# Patient Record
(demographics unavailable — no encounter records)

---

## 2024-12-27 NOTE — CONSULT LETTER
[Dear  ___] : Dear  [unfilled], [Courtesy Letter:] : I had the pleasure of seeing your patient, [unfilled], in my office today. [Please see my note below.] : Please see my note below. [Consult Closing:] : Thank you very much for allowing me to participate in the care of this patient.  If you have any questions, please do not hesitate to contact me. [Sincerely,] : Sincerely, [FreeTextEntry3] : Ave Diaz MD Pediatric Endocrinology [facility] [phone]

## 2024-12-27 NOTE — PHYSICAL EXAM
[Healthy Appearing] : healthy appearing [Well Nourished] : well nourished [Interactive] : interactive [Normal Appearance] : normal appearance [Well formed] : well formed [Normally Set] : normally set [Normal S1 and S2] : normal S1 and S2 [Clear to Ausculation Bilaterally] : clear to auscultation bilaterally [Abdomen Soft] : soft [Abdomen Tenderness] : non-tender [] : no hepatosplenomegaly [Normal] : normal  [Dysmorphic] : non-dysmorphic [Acanthosis Nigricans___] : no acanthosis nigricans [Microcephaly] : no microcephaly [WNL for age] : within normal limits of age [Goiter] : no goiter [Enlarged Diffusely] : was not enlarged [Murmur] : no murmurs

## 2024-12-27 NOTE — DATA REVIEWED
[FreeTextEntry1] : HbA1c decreased to 5.8%.  Additional labs:  TSH and FT4 normal  TG, TPO Ab- Negative  TSI- Negative   Fasting Glucose 93- Normal  C-peptide 2.2- Normal  Insulin level - 13.7- Normal  GAD65 Ab- Negative   HbA1c in office today 5.6%

## 2024-12-27 NOTE — REVIEW OF SYSTEMS
[Nl] : Neurological [Hair Symptoms] : no hair symptoms [Nail Symptoms] : no nail symptoms [Polydypsia] : no polydipsia [Polyuria] : no polyuria

## 2024-12-27 NOTE — ASSESSMENT
[FreeTextEntry1] : Mildred Mcginnis is a 14y6mF with previous history of obesity who is presenting for follow up endocrinology evaluation for elevated Hba1c.  Plan:  - HbA1c in office today normal- 5.6%, continues without signs/symptoms of diabetes.  - Advised today to follow up in 6-12 months give normal level. Will repeat POC HbA1c in office at visit.  - Counseled on signs/symptoms of diabetes and to call office if these occur prior to next visit.  - Continue with overall healthy eating, but no need for restricting carbohydrates.  - If continued HbA1c in pre-diabetes range, despite weight loss and healthy eating with negative T1D Ab, may need MEHUL testing.   Ave Diaz MD Pediatric Endocrinology [facility] [phone]

## 2024-12-27 NOTE — HISTORY OF PRESENT ILLNESS
[Regular Periods] : regular periods [FreeTextEntry2] : Mildred Mcginnis is a 14y6mF with previous history of obesity who is presenting for follow up endocrinology evaluation for elevated Hba1c.  Seen for initial visit on 2/7/24. She was last seen 5/15/24.   Reason for visit- Elevated HbA1c In 2019 she was told her HbA1c was elevated (5.9-6.0) after done on routine labs. At that time, she was overweight with weight 92-95%, BMI 85-89%. She made dietary changes and reported her HbA1c normalized.  She was seen by PCP in October 2023 for routine check up. At that time, labs done and she was noted to have had an increase in HbA1c to 6.0%. She reports that over the summer she consumed a significant amount of sugary foods (Ice cream every day, coca cola 2 per day, milkshakes everyday, and starbucks sugary drinks everyday). After the HbA1c increased in October, she made changes and has been eating healthier with less sugar. HbA1c rechecked in January 2024 and continued to have HbA1c of 6.0%.  After initial visit with me, labs ordered at last visit including T1D antibodies.  HbA1c decreased to 5.8%.  Additional labs:  TSH and FT4 normal  TG, TPO Ab- Negative  TSI- Negative   Fasting Glucose 93- Normal  C-peptide 2.2- Normal  Insulin level - 13.7- Normal  GAD65 Ab- Negative  ZnT8Ab- Pending Insulin Ab- Was not run  Islet Cell Ab- Does not appear to have been run.   Interval Events:  - Since last visit, reports she has continued with healthy eating, but has been having more [employer] drinks and chocolate compared to previous visit.  - She continues to deny: polyuria, polydipsia, excessive fatigue/tiredness.  - She has not lost weight since last visit, she has actually gained weight.  - HbA1c in office today 5.6%.  [FreeTextEntry1] : LMP 12/27/24.

## 2024-12-27 NOTE — DISCUSSION/SUMMARY
[FreeTextEntry1] : Mildred Mcginnis is a 14y6mF with previous history of obesity who is presenting for follow up endocrinology evaluation for elevated Hba1c.  Based on history and growth chart review, about 3 years ago, her weight was >95%. She used to eat more junk foods, carbs and sweets- liquid sugar (soda, juice, etc) every day. Hba1c at that time, reportedly elevated, but improved after dietary changes. She was seen by PCP in October 2023 and reported over that summer consuming large amounts of sugar and HbA1c was elevated at 6.0% and repeated 3 months later, remained at 6.0%.   Since initial visit with me, HbA1c has been improving. At previous visit was 5.8% and today in my office is 5.6%. T1D antibodies were ordered, but 2 were not run; however, given improvement and HbA1c not in pre-diabetes range today, does not need repeating Ab unless noticing an increase in HbA1c in the future.   She continues to deny any symptoms of diabetes today. Counseled patient and mother on these symptoms and to call sooner than next visit if issues occur.